# Patient Record
Sex: FEMALE | Race: WHITE | NOT HISPANIC OR LATINO | URBAN - METROPOLITAN AREA
[De-identification: names, ages, dates, MRNs, and addresses within clinical notes are randomized per-mention and may not be internally consistent; named-entity substitution may affect disease eponyms.]

---

## 2022-03-14 ENCOUNTER — OFFICE VISIT (OUTPATIENT)
Dept: URBAN - METROPOLITAN AREA CLINIC 78 | Facility: CLINIC | Age: 77
End: 2022-03-14
Payer: MEDICARE

## 2022-03-14 VITALS
TEMPERATURE: 97.3 F | SYSTOLIC BLOOD PRESSURE: 130 MMHG | BODY MASS INDEX: 29.46 KG/M2 | DIASTOLIC BLOOD PRESSURE: 58 MMHG | HEART RATE: 84 BPM | HEIGHT: 65 IN | WEIGHT: 176.8 LBS

## 2022-03-14 DIAGNOSIS — R13.19 CERVICAL DYSPHAGIA: ICD-10-CM

## 2022-03-14 DIAGNOSIS — Z12.11 COLON CANCER SCREENING: ICD-10-CM

## 2022-03-14 DIAGNOSIS — K59.09 CHANGE IN BOWEL MOVEMENTS INTERMITTENT CONSTIPATION. URGENCY IN THE MORNING.: ICD-10-CM

## 2022-03-14 PROBLEM — 14760008 CONSTIPATION: Status: ACTIVE | Noted: 2022-03-14

## 2022-03-14 PROBLEM — 40739000 DYSPHAGIA: Status: ACTIVE | Noted: 2022-03-14

## 2022-03-14 PROCEDURE — 99204 OFFICE O/P NEW MOD 45 MIN: CPT | Performed by: INTERNAL MEDICINE

## 2022-03-14 PROCEDURE — 99214 OFFICE O/P EST MOD 30 MIN: CPT | Performed by: INTERNAL MEDICINE

## 2022-03-14 PROCEDURE — 99244 OFF/OP CNSLTJ NEW/EST MOD 40: CPT | Performed by: INTERNAL MEDICINE

## 2022-03-14 RX ORDER — CETIRIZINE HYDROCHLORIDE 10 MG/1
1 TABLET TABLET, FILM COATED ORAL ONCE A DAY
Status: ACTIVE | COMMUNITY

## 2022-03-14 RX ORDER — POLYETHYLENE GLYCOL 3350, NF POWDER FOR SOLUTION, LAXATIVE 17 G/D
AS DIRECTED POWDER, FOR SOLUTION ORAL
Status: ACTIVE | COMMUNITY

## 2022-03-14 RX ORDER — ICOSAPENT ETHYL 1000 MG/1
2 CAPSULES WITH MEALS CAPSULE ORAL TWICE A DAY
Status: ACTIVE | COMMUNITY

## 2022-03-14 RX ORDER — ASPIRIN 81 MG/1
1 TABLET TABLET, CHEWABLE ORAL ONCE A DAY
Status: ACTIVE | COMMUNITY

## 2022-03-14 RX ORDER — ASCORBIC ACID 250 MG
1 TABLET TABLET,CHEWABLE ORAL ONCE A DAY
Status: ACTIVE | COMMUNITY

## 2022-03-14 RX ORDER — OMEPRAZOLE 40 MG/1
1 CAPSULE 30 MINUTES BEFORE MORNING MEAL CAPSULE, DELAYED RELEASE ORAL ONCE A DAY
Status: ACTIVE | COMMUNITY

## 2022-03-14 RX ORDER — CHOLECALCIFEROL (VITAMIN D3) 25 MCG
1 TABLET TABLET ORAL ONCE A DAY
Status: ACTIVE | COMMUNITY

## 2022-03-14 RX ORDER — FOLIC ACID 0.8 MG
1 TABLET TABLET ORAL ONCE A DAY
Status: ACTIVE | COMMUNITY

## 2022-03-14 RX ORDER — POTASSIUM GLUCONATE 2 MEQ
1 TABLET TABLET ORAL ONCE A DAY
Status: ACTIVE | COMMUNITY

## 2022-03-14 RX ORDER — METFORMIN HYDROCHLORIDE 500 MG/1
1 TABLET WITH A MEAL TABLET, FILM COATED ORAL ONCE A DAY
Status: ACTIVE | COMMUNITY

## 2022-03-14 RX ORDER — POLYETHYLENE GLYCOL 3350, SODIUM SULFATE, SODIUM CHLORIDE, POTASSIUM CHLORIDE, ASCORBIC ACID, SODIUM ASCORBATE 140-9-5.2G
AS DIRECTED KIT ORAL
Qty: 1 | Refills: 0 | OUTPATIENT
Start: 2022-03-14 | End: 2022-03-15

## 2022-03-14 RX ORDER — CALCIUM CARBONATE 300MG(750)
AS DIRECTED TABLET,CHEWABLE ORAL
Status: ACTIVE | COMMUNITY

## 2022-03-14 RX ORDER — POLYETHYLENE GLYCOL 400 AND PROPYLENE GLYCOL 4; 3 MG/ML; MG/ML
SOLUTION/ DROPS OPHTHALMIC
Qty: 1 | Refills: 0 | Status: DISCONTINUED | COMMUNITY
Start: 1900-01-01

## 2022-03-14 RX ORDER — DULOXETINE 60 MG/1
1 CAPSULE CAPSULE, DELAYED RELEASE PELLETS ORAL ONCE A DAY
Status: ACTIVE | COMMUNITY

## 2022-03-14 RX ORDER — ROSUVASTATIN CALCIUM 20 MG/1
1 TABLET TABLET, FILM COATED ORAL ONCE A DAY
Status: ACTIVE | COMMUNITY

## 2022-03-14 RX ORDER — LACTOBACILLUS ACIDOPHILUS/PECT 30 MG-20MG
1 TABLET TABLET ORAL ONCE A DAY
Status: ACTIVE | COMMUNITY

## 2022-03-14 RX ORDER — ONDANSETRON HYDROCHLORIDE 4 MG/1
1 TABLET TABLET, FILM COATED ORAL
Qty: 7 | Refills: 0 | OUTPATIENT
Start: 2022-03-14

## 2022-03-14 RX ORDER — BIMATOPROST 0.1 MG/ML
SOLUTION/ DROPS OPHTHALMIC
Qty: 1 | Refills: 0 | Status: DISCONTINUED | COMMUNITY
Start: 1900-01-01

## 2022-03-14 RX ORDER — ASCORBIC ACID 125 MG
AS DIRECTED TABLET,CHEWABLE ORAL
Status: ACTIVE | COMMUNITY

## 2022-03-14 RX ORDER — CYCLOSPORINE 0.5 MG/ML
EMULSION OPHTHALMIC
Qty: 0 | Refills: 0 | Status: DISCONTINUED | COMMUNITY
Start: 1900-01-01

## 2022-03-14 RX ORDER — ALBUTEROL SULFATE 90 UG/1
1 PUFF AS NEEDED AEROSOL, METERED RESPIRATORY (INHALATION)
Status: ACTIVE | COMMUNITY

## 2022-03-14 RX ORDER — BIMATOPROST 0.1 MG/ML
1 DROP INTO AFFECTED EYE IN THE EVENING SOLUTION/ DROPS OPHTHALMIC ONCE A DAY
Status: ACTIVE | COMMUNITY

## 2022-03-14 RX ORDER — LEVOTHYROXINE SODIUM 88 UG/1
1 TABLET IN THE MORNING ON AN EMPTY STOMACH TABLET ORAL ONCE A DAY
Status: ACTIVE | COMMUNITY

## 2022-03-14 RX ORDER — LIRAGLUTIDE 6 MG/ML
AS DIRECTED INJECTION SUBCUTANEOUS
Status: ACTIVE | COMMUNITY

## 2022-03-14 RX ORDER — ANTIARTHRITIC COMBINATION NO.2 900 MG
1 TABLET TABLET ORAL ONCE A DAY
Status: ACTIVE | COMMUNITY

## 2022-03-14 NOTE — HPI-TODAY'S VISIT:
The patient presents on referral from Aiyana Roberson MD , for a gastroenterology evaluation for a screening colonoscopy.  The patient states that she takes PEG daily for chronic constipation with excellent results. She tells me that she has a soft, formed BM daily. She has not seen any blood in the stools. She denies any abdominal pain.  She is also on a probiotic. She has intentionally tried to lose weight and has in fact lost 20lbs over the past few months. She has been on liquid diet.  She has a remote history of volvulus. She is s/p hysterectomy for endometriosis. She also has a history of ovarian cancer. She has know DM. She is on HCTZ for HTN. She does not take any blood thinners.  There is no FH of colon cancer or colon polyps.   Her last colonoscopy was in 2017; she is due for another one ths year.   She reports issues with anesthesia, where it takes longer than usual for her to wake up from anesthesia. She also recalls waking up very nauseous from anesthesia.  She has had chronic dyshagia.She admits that she eats and swallows very quickly. She underwent a barium swallow which revealed a spastic esophagus. She has never required esophageal diation. She had an EGD about 20 years ago.   Summary of prior work up: - Labs on 2/13/20: HbA1c 6.5%, Gluc 122, BUN 13, Cr 0.69, Na 141, K 4.5, TP 6.1, Alb 4.4, AST 32, ALT 42. TB 0.5. AP 44. TSH 1.15. - Colonoscopy in 2017 showed diverticulosis and internal hemorrhoids. A repeat colonoscopy was advised in five years. - Colonoscopy to the descending colon on 1/15/14 for evaluation of abnormal CAT scan findings consistent with thickening of the left colon showed a stercoral ulcer/volvulus. - Flexible sigmoidoscopy on 1/10/14 for evaluation of left lower quadrant abdominal pain showed diverticulosis but no colitis. Minimal friability was present in the distal rectum likely secondary to recent fleet enema. Left stephany biopsies showed mild edema but no microscopic colitis. - Colonoscopy on 2/14/13 for colon cancer screening revealed sigmoid diverticulosis, an 8 mm polyp in the rectum (biopsies revealed mild active proctitis). The quality of the prep was fair.

## 2022-03-14 NOTE — PHYSICAL EXAM GASTROINTESTINAL
Abdomen , soft, nontender, nondistended , no guarding or rigidity , no masses palpable , normal bowel sounds , Liver and Spleen , no hepatomegaly present , no hepatosplenomegaly , liver nontender , spleen not palpable Immunohistochemistry ([id number] and [id number]) billing is not performed here. Please use the Immunohistochemistry Stain Billing plan to accomplish this. Immunohistochemistry ([id number] and [id number]) billing is not performed here. Please use the Immunohistochemistry Stain Billing plan to accomplish this.

## 2022-03-23 ENCOUNTER — TELEPHONE ENCOUNTER (OUTPATIENT)
Dept: URBAN - METROPOLITAN AREA CLINIC 78 | Facility: CLINIC | Age: 77
End: 2022-03-23

## 2022-04-26 ENCOUNTER — TELEPHONE ENCOUNTER (OUTPATIENT)
Dept: URBAN - METROPOLITAN AREA CLINIC 40 | Facility: CLINIC | Age: 77
End: 2022-04-26

## 2022-05-02 ENCOUNTER — OFFICE VISIT (OUTPATIENT)
Dept: URBAN - METROPOLITAN AREA MEDICAL CENTER 10 | Facility: MEDICAL CENTER | Age: 77
End: 2022-05-02
Payer: MEDICARE

## 2022-05-02 DIAGNOSIS — K52.832 FOCAL LYMPHOCYTIC COLITIS: ICD-10-CM

## 2022-05-02 DIAGNOSIS — Z12.11 COLON CANCER SCREENING: ICD-10-CM

## 2022-05-02 PROCEDURE — 45385 COLONOSCOPY W/LESION REMOVAL: CPT | Performed by: INTERNAL MEDICINE

## 2022-05-02 PROCEDURE — 45380 COLONOSCOPY AND BIOPSY: CPT | Performed by: INTERNAL MEDICINE

## 2022-05-02 RX ORDER — ONDANSETRON HYDROCHLORIDE 4 MG/1
1 TABLET TABLET, FILM COATED ORAL
Qty: 7 | Refills: 0 | Status: ACTIVE | COMMUNITY
Start: 2022-03-14

## 2022-05-02 RX ORDER — BIMATOPROST 0.1 MG/ML
1 DROP INTO AFFECTED EYE IN THE EVENING SOLUTION/ DROPS OPHTHALMIC ONCE A DAY
Status: ACTIVE | COMMUNITY

## 2022-05-02 RX ORDER — FOLIC ACID 0.8 MG
1 TABLET TABLET ORAL ONCE A DAY
Status: ACTIVE | COMMUNITY

## 2022-05-02 RX ORDER — ICOSAPENT ETHYL 1000 MG/1
2 CAPSULES WITH MEALS CAPSULE ORAL TWICE A DAY
Status: ACTIVE | COMMUNITY

## 2022-05-02 RX ORDER — ALBUTEROL SULFATE 90 UG/1
1 PUFF AS NEEDED AEROSOL, METERED RESPIRATORY (INHALATION)
Status: ACTIVE | COMMUNITY

## 2022-05-02 RX ORDER — DULOXETINE 60 MG/1
1 CAPSULE CAPSULE, DELAYED RELEASE PELLETS ORAL ONCE A DAY
Status: ACTIVE | COMMUNITY

## 2022-05-02 RX ORDER — CETIRIZINE HYDROCHLORIDE 10 MG/1
1 TABLET TABLET, FILM COATED ORAL ONCE A DAY
Status: ACTIVE | COMMUNITY

## 2022-05-02 RX ORDER — LIRAGLUTIDE 6 MG/ML
AS DIRECTED INJECTION SUBCUTANEOUS
Status: ACTIVE | COMMUNITY

## 2022-05-02 RX ORDER — ASPIRIN 81 MG/1
1 TABLET TABLET, CHEWABLE ORAL ONCE A DAY
Status: ACTIVE | COMMUNITY

## 2022-05-02 RX ORDER — ANTIARTHRITIC COMBINATION NO.2 900 MG
1 TABLET TABLET ORAL ONCE A DAY
Status: ACTIVE | COMMUNITY

## 2022-05-02 RX ORDER — LEVOTHYROXINE SODIUM 88 UG/1
1 TABLET IN THE MORNING ON AN EMPTY STOMACH TABLET ORAL ONCE A DAY
Status: ACTIVE | COMMUNITY

## 2022-05-02 RX ORDER — ASCORBIC ACID 250 MG
1 TABLET TABLET,CHEWABLE ORAL ONCE A DAY
Status: ACTIVE | COMMUNITY

## 2022-05-02 RX ORDER — CHOLECALCIFEROL (VITAMIN D3) 25 MCG
1 TABLET TABLET ORAL ONCE A DAY
Status: ACTIVE | COMMUNITY

## 2022-05-02 RX ORDER — ASCORBIC ACID 125 MG
AS DIRECTED TABLET,CHEWABLE ORAL
Status: ACTIVE | COMMUNITY

## 2022-05-02 RX ORDER — POTASSIUM GLUCONATE 2 MEQ
1 TABLET TABLET ORAL ONCE A DAY
Status: ACTIVE | COMMUNITY

## 2022-05-02 RX ORDER — OMEPRAZOLE 40 MG/1
1 CAPSULE 30 MINUTES BEFORE MORNING MEAL CAPSULE, DELAYED RELEASE ORAL ONCE A DAY
Status: ACTIVE | COMMUNITY

## 2022-05-02 RX ORDER — METFORMIN HYDROCHLORIDE 500 MG/1
1 TABLET WITH A MEAL TABLET, FILM COATED ORAL ONCE A DAY
Status: ACTIVE | COMMUNITY

## 2022-05-02 RX ORDER — ROSUVASTATIN CALCIUM 20 MG/1
1 TABLET TABLET, FILM COATED ORAL ONCE A DAY
Status: ACTIVE | COMMUNITY

## 2022-05-02 RX ORDER — POLYETHYLENE GLYCOL 3350, NF POWDER FOR SOLUTION, LAXATIVE 17 G/D
AS DIRECTED POWDER, FOR SOLUTION ORAL
Status: ACTIVE | COMMUNITY

## 2022-05-02 RX ORDER — LACTOBACILLUS ACIDOPHILUS/PECT 30 MG-20MG
1 TABLET TABLET ORAL ONCE A DAY
Status: ACTIVE | COMMUNITY

## 2022-05-02 RX ORDER — CALCIUM CARBONATE 300MG(750)
AS DIRECTED TABLET,CHEWABLE ORAL
Status: ACTIVE | COMMUNITY

## 2024-03-11 ENCOUNTER — OV EP (OUTPATIENT)
Dept: URBAN - METROPOLITAN AREA CLINIC 78 | Facility: CLINIC | Age: 79
End: 2024-03-11
Payer: MEDICARE

## 2024-03-11 VITALS
WEIGHT: 196.4 LBS | HEIGHT: 65 IN | RESPIRATION RATE: 14 BRPM | TEMPERATURE: 98.1 F | DIASTOLIC BLOOD PRESSURE: 70 MMHG | BODY MASS INDEX: 32.72 KG/M2 | HEART RATE: 72 BPM | SYSTOLIC BLOOD PRESSURE: 128 MMHG

## 2024-03-11 DIAGNOSIS — R09.89 CHOKING EPISODE: ICD-10-CM

## 2024-03-11 DIAGNOSIS — R13.10 DYSPHAGIA: ICD-10-CM

## 2024-03-11 DIAGNOSIS — Z12.11 COLON CANCER SCREENING: ICD-10-CM

## 2024-03-11 DIAGNOSIS — K59.01 CONSTIPATION: ICD-10-CM

## 2024-03-11 PROCEDURE — 99214 OFFICE O/P EST MOD 30 MIN: CPT | Performed by: INTERNAL MEDICINE

## 2024-03-11 RX ORDER — CHOLECALCIFEROL (VITAMIN D3) 25 MCG
1 TABLET TABLET ORAL ONCE A DAY
Status: ACTIVE | COMMUNITY

## 2024-03-11 RX ORDER — LACTOBACILLUS ACIDOPHILUS/PECT 30 MG-20MG
1 TABLET TABLET ORAL ONCE A DAY
Status: ACTIVE | COMMUNITY

## 2024-03-11 RX ORDER — METFORMIN HYDROCHLORIDE 500 MG/1
1 TABLET WITH A MEAL TABLET, FILM COATED ORAL ONCE A DAY
Status: ACTIVE | COMMUNITY

## 2024-03-11 RX ORDER — ICOSAPENT ETHYL 1000 MG/1
2 CAPSULES WITH MEALS CAPSULE ORAL TWICE A DAY
Status: ACTIVE | COMMUNITY

## 2024-03-11 RX ORDER — CETIRIZINE HYDROCHLORIDE 10 MG/1
1 TABLET TABLET, FILM COATED ORAL ONCE A DAY
Status: ACTIVE | COMMUNITY

## 2024-03-11 RX ORDER — POTASSIUM GLUCONATE 2 MEQ
1 TABLET TABLET ORAL ONCE A DAY
Status: ACTIVE | COMMUNITY

## 2024-03-11 RX ORDER — ANTIARTHRITIC COMBINATION NO.2 900 MG
1 TABLET TABLET ORAL ONCE A DAY
Status: ACTIVE | COMMUNITY

## 2024-03-11 RX ORDER — OMEPRAZOLE 40 MG/1
1 CAPSULE 30 MINUTES BEFORE MORNING MEAL CAPSULE, DELAYED RELEASE ORAL ONCE A DAY
Status: ACTIVE | COMMUNITY

## 2024-03-11 RX ORDER — ASPIRIN 81 MG/1
1 TABLET TABLET, CHEWABLE ORAL ONCE A DAY
Status: ACTIVE | COMMUNITY

## 2024-03-11 RX ORDER — ALBUTEROL SULFATE 90 UG/1
1 PUFF AS NEEDED AEROSOL, METERED RESPIRATORY (INHALATION)
Status: ACTIVE | COMMUNITY

## 2024-03-11 RX ORDER — POLYETHYLENE GLYCOL 3350, NF POWDER FOR SOLUTION, LAXATIVE 17 G/D
AS DIRECTED POWDER, FOR SOLUTION ORAL
Status: ACTIVE | COMMUNITY

## 2024-03-11 RX ORDER — LEVOTHYROXINE SODIUM 88 UG/1
1 TABLET IN THE MORNING ON AN EMPTY STOMACH TABLET ORAL ONCE A DAY
Status: ACTIVE | COMMUNITY

## 2024-03-11 RX ORDER — CALCIUM CARBONATE 300MG(750)
AS DIRECTED TABLET,CHEWABLE ORAL
Status: ACTIVE | COMMUNITY

## 2024-03-11 RX ORDER — ASCORBIC ACID 125 MG
AS DIRECTED TABLET,CHEWABLE ORAL
Status: ACTIVE | COMMUNITY

## 2024-03-11 RX ORDER — FOLIC ACID 0.8 MG
1 TABLET TABLET ORAL ONCE A DAY
Status: ACTIVE | COMMUNITY

## 2024-03-11 RX ORDER — ONDANSETRON HYDROCHLORIDE 4 MG/1
1 TABLET TABLET, FILM COATED ORAL
Qty: 7 | Refills: 0 | Status: ACTIVE | COMMUNITY
Start: 2022-03-14

## 2024-03-11 RX ORDER — ROSUVASTATIN CALCIUM 20 MG/1
1 TABLET TABLET, FILM COATED ORAL ONCE A DAY
Status: ACTIVE | COMMUNITY

## 2024-03-11 RX ORDER — BIMATOPROST 0.1 MG/ML
1 DROP INTO AFFECTED EYE IN THE EVENING SOLUTION/ DROPS OPHTHALMIC ONCE A DAY
Status: ACTIVE | COMMUNITY

## 2024-03-11 RX ORDER — LIRAGLUTIDE 6 MG/ML
AS DIRECTED INJECTION SUBCUTANEOUS
Status: ACTIVE | COMMUNITY

## 2024-03-11 RX ORDER — ASCORBIC ACID 250 MG
1 TABLET TABLET,CHEWABLE ORAL ONCE A DAY
Status: ACTIVE | COMMUNITY

## 2024-03-11 RX ORDER — DULOXETINE 60 MG/1
1 CAPSULE CAPSULE, DELAYED RELEASE PELLETS ORAL ONCE A DAY
Status: ACTIVE | COMMUNITY

## 2024-03-11 NOTE — PHYSICAL EXAM NEUROLOGIC:
ASA 325mg qd per home medication list. Reason for this dose unclear.     oriented to person, place and time , normal sensation , short and long term memory intact

## 2024-03-11 NOTE — HPI-TODAY'S VISIT:
The patient presents on referral from Cuong Pastor MD for dysphagia.  She reports issues with dysphagia present for at least the past 2-3 years. She has noticed it works ot dry meats and rice. It has gotten worse lately. Sometimes she will also choke on her own saliva.  She admits that she eats and swallows very quickly. She underwent a barium swallow which revealed a spastic esophagus. She had barium swallow 7-8 years ago and the Barium pill took a while to go down.She has never required esophageal diation. She had an EGD about 20 years ago. She swallows multiple pills and has no issues with these.  She has been on Omeprazole 40mg daily.   The patient states that she takes PEG daily for chronic constipation with excellent results. She tells me that she has a soft, formed BM daily. She has not seen any blood in the stools. She denies any abdominal pain.  She is also on a probiotic.  She has a remote history of volvulus.   She is s/p hysterectomy for endometriosis. She also has a history of ovarian cancer.   She has known DM. She is on Ozempic and last took it on Wednesday.  She does not take any blood thinners.  There is no FH of colon cancer or colon polyps.   Today we reviewed the results of her colonoscophy done by me in 2022.   **She reports issues with anesthesia, where it takes longer than usual for her to wake up from anesthesia. She also recalls waking up very nauseous from anesthesia. She had a colonoscopy in 2022 and had absolutely no issues waking up from the Propofol hence I have no issues scheduling her at [facility] [place].  She is sheduled for shoulder surgery on 3/19.   Summary of prior work up: - Colonoscopy by me on 5/2/22: Both polyps removed were benign, but with evidence of lymphocyctic colitits. Given the fair quality of the prep however I do recommend a repeat colonoscopy in 3 years if her overall health status allows. - Labs on 2/13/20: HbA1c 6.5%, Gluc 122, BUN 13, Cr 0.69, Na 141, K 4.5, TP 6.1, Alb 4.4, AST 32, ALT 42. TB 0.5. AP 44. TSH 1.15. - Colonoscopy in 2017 showed diverticulosis and internal hemorrhoids. A repeat colonoscopy was advised in five years. - Colonoscopy to the descending colon on 1/15/14 for evaluation of abnormal CAT scan findings consistent with thickening of the left colon showed a stercoral ulcer/volvulus. - Flexible sigmoidoscopy on 1/10/14 for evaluation of left lower quadrant abdominal pain showed diverticulosis but no colitis. Minimal friability was present in the distal rectum likely secondary to recent fleet enema. Left stephany biopsies showed mild edema but no microscopic colitis. - Colonoscopy on 2/14/13 for colon cancer screening revealed sigmoid diverticulosis, an 8 mm polyp in the rectum (biopsies revealed mild active proctitis). The quality of the prep was fair.

## 2024-03-15 ENCOUNTER — LAB (OUTPATIENT)
Dept: URBAN - METROPOLITAN AREA CLINIC 4 | Facility: CLINIC | Age: 79
End: 2024-03-15
Payer: MEDICARE

## 2024-03-15 ENCOUNTER — EGD (OUTPATIENT)
Dept: URBAN - METROPOLITAN AREA SURGERY CENTER 15 | Facility: SURGERY CENTER | Age: 79
End: 2024-03-15
Payer: MEDICARE

## 2024-03-15 DIAGNOSIS — T47.8X5A ADVERSE EFFECT OF OTHER AGENTS PRIMARILY AFFECTING GASTROINTESTINAL SYSTEM, INITIAL ENCOUNTER: ICD-10-CM

## 2024-03-15 DIAGNOSIS — K29.70 GASTRITIS, UNSPECIFIED, WITHOUT BLEEDING: ICD-10-CM

## 2024-03-15 DIAGNOSIS — K31.89 OTHER DISEASES OF STOMACH AND DUODENUM: ICD-10-CM

## 2024-03-15 DIAGNOSIS — K21.9 GASTRO-ESOPHAGEAL REFLUX DISEASE WITHOUT ESOPHAGITIS: ICD-10-CM

## 2024-03-15 DIAGNOSIS — K22.70 BARRETT'S ESOPHAGUS WITHOUT DYSPLASIA: ICD-10-CM

## 2024-03-15 DIAGNOSIS — R13.19 DYSPHAGIA: ICD-10-CM

## 2024-03-15 DIAGNOSIS — I85.00 ESOPHAGEAL VARICES WITHOUT BLEEDING, UNSPECIFIED ESOPHAGEAL VARICES TYPE: ICD-10-CM

## 2024-03-15 PROCEDURE — 43239 EGD BIOPSY SINGLE/MULTIPLE: CPT | Performed by: INTERNAL MEDICINE

## 2024-03-15 PROCEDURE — 88312 SPECIAL STAINS GROUP 1: CPT | Performed by: PATHOLOGY

## 2024-03-15 PROCEDURE — 88305 TISSUE EXAM BY PATHOLOGIST: CPT | Performed by: PATHOLOGY

## 2024-03-15 PROCEDURE — 43249 ESOPH EGD DILATION <30 MM: CPT | Performed by: INTERNAL MEDICINE

## 2024-03-15 RX ORDER — ALBUTEROL SULFATE 90 UG/1
1 PUFF AS NEEDED AEROSOL, METERED RESPIRATORY (INHALATION)
Status: ACTIVE | COMMUNITY

## 2024-03-15 RX ORDER — DULOXETINE 60 MG/1
1 CAPSULE CAPSULE, DELAYED RELEASE PELLETS ORAL ONCE A DAY
Status: ACTIVE | COMMUNITY

## 2024-03-15 RX ORDER — BIMATOPROST 0.1 MG/ML
1 DROP INTO AFFECTED EYE IN THE EVENING SOLUTION/ DROPS OPHTHALMIC ONCE A DAY
Status: ACTIVE | COMMUNITY

## 2024-03-15 RX ORDER — OMEPRAZOLE 40 MG/1
1 CAPSULE 30 MINUTES BEFORE MORNING MEAL CAPSULE, DELAYED RELEASE ORAL ONCE A DAY
Status: ACTIVE | COMMUNITY

## 2024-03-15 RX ORDER — ONDANSETRON HYDROCHLORIDE 4 MG/1
1 TABLET TABLET, FILM COATED ORAL
Qty: 7 | Refills: 0 | Status: ACTIVE | COMMUNITY
Start: 2022-03-14

## 2024-03-15 RX ORDER — ASCORBIC ACID 250 MG
1 TABLET TABLET,CHEWABLE ORAL ONCE A DAY
Status: ACTIVE | COMMUNITY

## 2024-03-15 RX ORDER — ASPIRIN 81 MG/1
1 TABLET TABLET, CHEWABLE ORAL ONCE A DAY
Status: ACTIVE | COMMUNITY

## 2024-03-15 RX ORDER — LEVOTHYROXINE SODIUM 88 UG/1
1 TABLET IN THE MORNING ON AN EMPTY STOMACH TABLET ORAL ONCE A DAY
Status: ACTIVE | COMMUNITY

## 2024-03-15 RX ORDER — ICOSAPENT ETHYL 1000 MG/1
2 CAPSULES WITH MEALS CAPSULE ORAL TWICE A DAY
Status: ACTIVE | COMMUNITY

## 2024-03-15 RX ORDER — LIRAGLUTIDE 6 MG/ML
AS DIRECTED INJECTION SUBCUTANEOUS
Status: ACTIVE | COMMUNITY

## 2024-03-15 RX ORDER — CHOLECALCIFEROL (VITAMIN D3) 25 MCG
1 TABLET TABLET ORAL ONCE A DAY
Status: ACTIVE | COMMUNITY

## 2024-03-15 RX ORDER — ROSUVASTATIN CALCIUM 20 MG/1
1 TABLET TABLET, FILM COATED ORAL ONCE A DAY
Status: ACTIVE | COMMUNITY

## 2024-03-15 RX ORDER — ASCORBIC ACID 125 MG
AS DIRECTED TABLET,CHEWABLE ORAL
Status: ACTIVE | COMMUNITY

## 2024-03-15 RX ORDER — POLYETHYLENE GLYCOL 3350, NF POWDER FOR SOLUTION, LAXATIVE 17 G/D
AS DIRECTED POWDER, FOR SOLUTION ORAL
Status: ACTIVE | COMMUNITY

## 2024-03-15 RX ORDER — METFORMIN HYDROCHLORIDE 500 MG/1
1 TABLET WITH A MEAL TABLET, FILM COATED ORAL ONCE A DAY
Status: ACTIVE | COMMUNITY

## 2024-03-15 RX ORDER — FOLIC ACID 0.8 MG
1 TABLET TABLET ORAL ONCE A DAY
Status: ACTIVE | COMMUNITY

## 2024-03-15 RX ORDER — LACTOBACILLUS ACIDOPHILUS/PECT 30 MG-20MG
1 TABLET TABLET ORAL ONCE A DAY
Status: ACTIVE | COMMUNITY

## 2024-03-15 RX ORDER — POTASSIUM GLUCONATE 2 MEQ
1 TABLET TABLET ORAL ONCE A DAY
Status: ACTIVE | COMMUNITY

## 2024-03-15 RX ORDER — ANTIARTHRITIC COMBINATION NO.2 900 MG
1 TABLET TABLET ORAL ONCE A DAY
Status: ACTIVE | COMMUNITY

## 2024-03-15 RX ORDER — CETIRIZINE HYDROCHLORIDE 10 MG/1
1 TABLET TABLET, FILM COATED ORAL ONCE A DAY
Status: ACTIVE | COMMUNITY

## 2024-03-15 RX ORDER — CALCIUM CARBONATE 300MG(750)
AS DIRECTED TABLET,CHEWABLE ORAL
Status: ACTIVE | COMMUNITY

## 2024-04-17 ENCOUNTER — TELEP (OUTPATIENT)
Dept: URBAN - METROPOLITAN AREA TELEHEALTH 2 | Facility: TELEHEALTH | Age: 79
End: 2024-04-17
Payer: MEDICARE

## 2024-04-17 DIAGNOSIS — I85.00 DOWNHILL ESOPHAGEAL VARICES: ICD-10-CM

## 2024-04-17 DIAGNOSIS — K59.01 CONSTIPATION: ICD-10-CM

## 2024-04-17 DIAGNOSIS — R09.89 CHOKING EPISODE: ICD-10-CM

## 2024-04-17 DIAGNOSIS — R13.10 DYSPHAGIA: ICD-10-CM

## 2024-04-17 PROCEDURE — 99214 OFFICE O/P EST MOD 30 MIN: CPT | Performed by: INTERNAL MEDICINE

## 2024-04-17 RX ORDER — ROSUVASTATIN CALCIUM 20 MG/1
1 TABLET TABLET, FILM COATED ORAL ONCE A DAY
Status: ACTIVE | COMMUNITY

## 2024-04-17 RX ORDER — LEVOTHYROXINE SODIUM 88 UG/1
1 TABLET IN THE MORNING ON AN EMPTY STOMACH TABLET ORAL ONCE A DAY
Status: ACTIVE | COMMUNITY

## 2024-04-17 RX ORDER — ASPIRIN 81 MG/1
1 TABLET TABLET, CHEWABLE ORAL ONCE A DAY
Status: ACTIVE | COMMUNITY

## 2024-04-17 RX ORDER — LIRAGLUTIDE 6 MG/ML
AS DIRECTED INJECTION SUBCUTANEOUS
Status: ACTIVE | COMMUNITY

## 2024-04-17 RX ORDER — ASCORBIC ACID 250 MG
1 TABLET TABLET,CHEWABLE ORAL ONCE A DAY
Status: ACTIVE | COMMUNITY

## 2024-04-17 RX ORDER — ANTIARTHRITIC COMBINATION NO.2 900 MG
1 TABLET TABLET ORAL ONCE A DAY
Status: ACTIVE | COMMUNITY

## 2024-04-17 RX ORDER — FOLIC ACID 0.8 MG
1 TABLET TABLET ORAL ONCE A DAY
Status: ACTIVE | COMMUNITY

## 2024-04-17 RX ORDER — ICOSAPENT ETHYL 1000 MG/1
2 CAPSULES WITH MEALS CAPSULE ORAL TWICE A DAY
Status: ACTIVE | COMMUNITY

## 2024-04-17 RX ORDER — ONDANSETRON HYDROCHLORIDE 4 MG/1
1 TABLET TABLET, FILM COATED ORAL
Qty: 7 | Refills: 0 | Status: ACTIVE | COMMUNITY
Start: 2022-03-14

## 2024-04-17 RX ORDER — BIMATOPROST 0.1 MG/ML
1 DROP INTO AFFECTED EYE IN THE EVENING SOLUTION/ DROPS OPHTHALMIC ONCE A DAY
Status: ACTIVE | COMMUNITY

## 2024-04-17 RX ORDER — CHOLECALCIFEROL (VITAMIN D3) 25 MCG
1 TABLET TABLET ORAL ONCE A DAY
Status: ACTIVE | COMMUNITY

## 2024-04-17 RX ORDER — LACTOBACILLUS ACIDOPHILUS/PECT 30 MG-20MG
1 TABLET TABLET ORAL ONCE A DAY
Status: ACTIVE | COMMUNITY

## 2024-04-17 RX ORDER — POTASSIUM GLUCONATE 2 MEQ
1 TABLET TABLET ORAL ONCE A DAY
Status: ACTIVE | COMMUNITY

## 2024-04-17 RX ORDER — DULOXETINE 60 MG/1
1 CAPSULE CAPSULE, DELAYED RELEASE PELLETS ORAL ONCE A DAY
Status: ACTIVE | COMMUNITY

## 2024-04-17 RX ORDER — CETIRIZINE HYDROCHLORIDE 10 MG/1
1 TABLET TABLET, FILM COATED ORAL ONCE A DAY
Status: ACTIVE | COMMUNITY

## 2024-04-17 RX ORDER — CALCIUM CARBONATE 300MG(750)
AS DIRECTED TABLET,CHEWABLE ORAL
Status: ACTIVE | COMMUNITY

## 2024-04-17 RX ORDER — ALBUTEROL SULFATE 90 UG/1
1 PUFF AS NEEDED AEROSOL, METERED RESPIRATORY (INHALATION)
Status: ACTIVE | COMMUNITY

## 2024-04-17 RX ORDER — METFORMIN HYDROCHLORIDE 500 MG/1
1 TABLET WITH A MEAL TABLET, FILM COATED ORAL ONCE A DAY
Status: ACTIVE | COMMUNITY

## 2024-04-17 RX ORDER — POLYETHYLENE GLYCOL 3350, NF POWDER FOR SOLUTION, LAXATIVE 17 G/D
1 SCOOP MIXED WITH 8 OUNCES OF FLUID POWDER, FOR SOLUTION ORAL
Qty: 1 | Refills: 1 | Status: ACTIVE | COMMUNITY

## 2024-04-17 RX ORDER — ASCORBIC ACID 125 MG
AS DIRECTED TABLET,CHEWABLE ORAL
Status: ACTIVE | COMMUNITY

## 2024-04-17 RX ORDER — OMEPRAZOLE 40 MG/1
1 CAPSULE 30 MINUTES BEFORE MORNING MEAL CAPSULE, DELAYED RELEASE ORAL ONCE A DAY
Status: ACTIVE | COMMUNITY

## 2024-04-17 NOTE — EXAM-PHYSICAL EXAM
VIRTUAL PHYSICAL EXAM    Comprehensive gastrointestinal virtual examination:  I asked the patient to be my hands.  I guided the patient to palpate and apply pressure at all four abdominal quadrants.  In response to these maneuvers, patient reported the following:       - Negative response to abdominal pain   - Abdomen is soft, non-tender, and non-distended   - No palpable abdominal masses   - No palpable organs in the upper abdomen (no hepatomegaly or splenomegaly)       Constitutional - Ability to communicate:  Normal communication ability       Chest - Respiratory effort:  Breathing sounds unlabored       Cardiovascular - Palpation - Denies chest pain with palpation   Neurologic - Orientation:  Oriented to person, place, and time       Psychiatric:  Normal mood

## 2024-04-17 NOTE — HPI-TODAY'S VISIT:
The patient presents on referral from Cuong Pastor MD for choking.  She reports issues with dysphagia present for at least the past 2-3 years.  She admits that she eats and swallows very quickly. She underwent a barium swallow which revealed a spastic esophagus. She had barium swallow 7-8 years ago and the Barium pill took a while to go down.She has never required esophageal diation.  Today we reviewed the results of her EGD and CT Chest.  She swallows multiple pills and has no issues with these. She has been on Omeprazole 40mg daily with good control of symptoms.  She has continued having episodes of choking despite undergoing empiric esoph dilation. She has noticed it works ot dry meats and rice. It has gotten worse lately. Sometimes she will also choke on her own saliva.  The patient states that she takes PEG daily for chronic constipation with excellent results. She tells me that she has a soft, formed BM daily. She has not seen any blood in the stools. She denies any abdominal pain. She is also on a probiotic.  She has a remote history of volvulus.  She is s/p hysterectomy for endometriosis. She also has a history of ovarian cancer.  She has known DM. She is on Ozempic. She does not take any blood thinners.  There is no FH of colon cancer or colon polyps.  She reently had shoulder surgery on 3/19.  [place] will [place] moving to [place].  **She reports issues with anesthesia, where it takes longer than usual for her to wake up from anesthesia. She also recalls waking up very nauseous from anesthesia. She had a colonoscopy in 2022 and had absolutely no issues waking up from the Propofol hence I have no issues scheduling her at [facility] [place].    Summary of prior work up: - CT Chest without contrast on 3/18/24: The lower neck and thoracic inlet are normal. No lymph node enlargement. The heart size is normal. Normal caliber central pulmonary arteries. No coronary artery calcification. No pericardial effusion or thickening. Normal caliber thoracic aorta with mild atherosclerotic calcification. The central airways are patent. No suspicious pulmonary nodules. No pleural effusion or thickening. - EGD by me on 3/15/2024 revealed downhill esophageal varices in the upper third of the esophagus. 1 tongues of salmon-colored mucosa present from 36-37 cm consistent with NDBE. No endoscopic evidence of stricture status post empiric dilation to 20 mm. No gross lesions in the fundus. Few fundic gland polyps in the body. Patchy mild erythema in the antrum. 2 cm hiatal hernia. Patent pyloric channel. Adequate sliding of the fold under insufflation. Normal duodenum and ampulla. Biopsies were negative for H. pylori or intestinal metaplasia. - Colonoscopy by me on 5/2/22: Both polyps removed were benign, but with evidence of lymphocyctic colitits. Given the fair quality of the prep however I do recommend a repeat colonoscopy in 3 years if her overall health status allows. - Labs on 2/13/20: HbA1c 6.5%, Gluc 122, BUN 13, Cr 0.69, Na 141, K 4.5, TP 6.1, Alb 4.4, AST 32, ALT 42. TB 0.5. AP 44. TSH 1.15. - Colonoscopy in 2017 showed diverticulosis and internal hemorrhoids. A repeat colonoscopy was advised in five years. - Colonoscopy to the descending colon on 1/15/14 for evaluation of abnormal CAT scan findings consistent with thickening of the left colon showed a stercoral ulcer/volvulus. - Flexible sigmoidoscopy on 1/10/14 for evaluation of left lower quadrant abdominal pain showed diverticulosis but no colitis. Minimal friability was present in the distal rectum likely secondary to recent fleet enema. Left stephany biopsies showed mild edema but no microscopic colitis. - Colonoscopy on 2/14/13 for colon cancer screening revealed sigmoid diverticulosis, an 8 mm polyp in the rectum (biopsies revealed mild active proctitis). The quality of the prep was fair.

## 2024-06-05 ENCOUNTER — TELEPHONE ENCOUNTER (OUTPATIENT)
Dept: URBAN - METROPOLITAN AREA CLINIC 78 | Facility: CLINIC | Age: 79
End: 2024-06-05

## 2024-06-28 ENCOUNTER — DASHBOARD ENCOUNTERS (OUTPATIENT)
Age: 79
End: 2024-06-28

## 2024-07-01 ENCOUNTER — OFFICE VISIT (OUTPATIENT)
Dept: URBAN - METROPOLITAN AREA CLINIC 78 | Facility: CLINIC | Age: 79
End: 2024-07-01
Payer: MEDICARE

## 2024-07-01 VITALS
DIASTOLIC BLOOD PRESSURE: 75 MMHG | BODY MASS INDEX: 31.79 KG/M2 | SYSTOLIC BLOOD PRESSURE: 154 MMHG | HEIGHT: 65 IN | TEMPERATURE: 98 F | WEIGHT: 190.8 LBS | HEART RATE: 76 BPM

## 2024-07-01 DIAGNOSIS — R13.19 CERVICAL DYSPHAGIA: ICD-10-CM

## 2024-07-01 DIAGNOSIS — R09.89 CHOKING EPISODE: ICD-10-CM

## 2024-07-01 DIAGNOSIS — I85.00 DOWNHILL ESOPHAGEAL VARICES: ICD-10-CM

## 2024-07-01 DIAGNOSIS — K59.01 CONSTIPATION: ICD-10-CM

## 2024-07-01 PROCEDURE — 99214 OFFICE O/P EST MOD 30 MIN: CPT | Performed by: INTERNAL MEDICINE

## 2024-07-01 RX ORDER — LEVOTHYROXINE SODIUM 88 UG/1
1 TABLET IN THE MORNING ON AN EMPTY STOMACH TABLET ORAL ONCE A DAY
Status: ACTIVE | COMMUNITY

## 2024-07-01 RX ORDER — POTASSIUM GLUCONATE 2 MEQ
1 TABLET TABLET ORAL ONCE A DAY
Status: ACTIVE | COMMUNITY

## 2024-07-01 RX ORDER — ANTIARTHRITIC COMBINATION NO.2 900 MG
1 TABLET TABLET ORAL ONCE A DAY
Status: ACTIVE | COMMUNITY

## 2024-07-01 RX ORDER — BIMATOPROST 0.1 MG/ML
1 DROP INTO AFFECTED EYE IN THE EVENING SOLUTION/ DROPS OPHTHALMIC ONCE A DAY
Status: ACTIVE | COMMUNITY

## 2024-07-01 RX ORDER — OMEPRAZOLE 40 MG/1
1 CAPSULE 30 MINUTES BEFORE MORNING MEAL CAPSULE, DELAYED RELEASE ORAL ONCE A DAY
Status: ACTIVE | COMMUNITY

## 2024-07-01 RX ORDER — FOLIC ACID 0.8 MG
1 TABLET TABLET ORAL ONCE A DAY
Status: ACTIVE | COMMUNITY

## 2024-07-01 RX ORDER — ONDANSETRON HYDROCHLORIDE 4 MG/1
1 TABLET TABLET, FILM COATED ORAL
Qty: 7 | Refills: 0 | Status: ACTIVE | COMMUNITY
Start: 2022-03-14

## 2024-07-01 RX ORDER — CHOLECALCIFEROL (VITAMIN D3) 25 MCG
1 TABLET TABLET ORAL ONCE A DAY
Status: ACTIVE | COMMUNITY

## 2024-07-01 RX ORDER — ASCORBIC ACID 250 MG
1 TABLET TABLET,CHEWABLE ORAL ONCE A DAY
Status: ACTIVE | COMMUNITY

## 2024-07-01 RX ORDER — CALCIUM CARBONATE 300MG(750)
AS DIRECTED TABLET,CHEWABLE ORAL
Status: ACTIVE | COMMUNITY

## 2024-07-01 RX ORDER — ASPIRIN 81 MG/1
1 TABLET TABLET, CHEWABLE ORAL ONCE A DAY
Status: ACTIVE | COMMUNITY

## 2024-07-01 RX ORDER — ICOSAPENT ETHYL 1000 MG/1
2 CAPSULES WITH MEALS CAPSULE ORAL TWICE A DAY
Status: ACTIVE | COMMUNITY

## 2024-07-01 RX ORDER — LIRAGLUTIDE 6 MG/ML
AS DIRECTED INJECTION SUBCUTANEOUS
Status: ACTIVE | COMMUNITY

## 2024-07-01 RX ORDER — DULOXETINE 60 MG/1
1 CAPSULE CAPSULE, DELAYED RELEASE PELLETS ORAL ONCE A DAY
Status: ACTIVE | COMMUNITY

## 2024-07-01 RX ORDER — LACTOBACILLUS ACIDOPHILUS/PECT 30 MG-20MG
1 TABLET TABLET ORAL ONCE A DAY
Status: ACTIVE | COMMUNITY

## 2024-07-01 RX ORDER — ASCORBIC ACID 125 MG
AS DIRECTED TABLET,CHEWABLE ORAL
Status: ACTIVE | COMMUNITY

## 2024-07-01 RX ORDER — POLYETHYLENE GLYCOL 3350, NF POWDER FOR SOLUTION, LAXATIVE 17 G/D
1 SCOOP MIXED WITH 8 OUNCES OF FLUID POWDER, FOR SOLUTION ORAL
Qty: 1 | Refills: 1 | Status: ACTIVE | COMMUNITY

## 2024-07-01 RX ORDER — CETIRIZINE HYDROCHLORIDE 10 MG/1
1 TABLET TABLET, FILM COATED ORAL ONCE A DAY
Status: ACTIVE | COMMUNITY

## 2024-07-01 RX ORDER — ALBUTEROL SULFATE 90 UG/1
1 PUFF AS NEEDED AEROSOL, METERED RESPIRATORY (INHALATION)
Status: ACTIVE | COMMUNITY

## 2024-07-01 RX ORDER — METFORMIN HYDROCHLORIDE 500 MG/1
1 TABLET WITH A MEAL TABLET, FILM COATED ORAL ONCE A DAY
Status: ACTIVE | COMMUNITY

## 2024-07-01 RX ORDER — ROSUVASTATIN 20 MG/1
1 TABLET TABLET, FILM COATED ORAL ONCE A DAY
Status: ACTIVE | COMMUNITY

## 2024-07-01 NOTE — HPI-TODAY'S VISIT:
The patient presents on referral from Cuong Pastor MD for choking.  She reports issues with dysphagia present for at least the past 2-3 years.  She admits that she eats and swallows very quickly. She underwent a barium swallow which revealed a spastic esophagus. She had barium swallow 7-8 years ago and the Barium pill took a while to go down. She swallows multiple pills and has no issues with these. She has been on Omeprazole 40mg daily with good control of symptoms.  She has continued having episodes of choking despite undergoing empiric esoph dilation (no stricture per se was seen). She has noticed it mainly to dry meats and rice. Sometimes she will also choke on her own saliva. These episodes of choking also occur to her daughter and sister.  Today we reviewed the results of her MBS. Modified barium study performed on 5/2/2024 revealed trace lingual and vallecular residue is cleared with additional swallow independently.  Esophageal screen is positive for retention and retrograde flow.  Barium pill retained in the medial to distal esophagus for more than 60 seconds after swallowing.  Cleared with mildly thick liquid.  Retention and retrograde flow of majority of puree bolus in the proximal esophagus noted.  Majority of puree bolus still remained in esophagus after greater than 60 seconds. Although pharyngeally the patient does not demonstrate any deficits at this time, retention and retrograde flow to proximal esophagus place.  High risk of aspirating retained bolus after the swallow.  It was recommended that patient consume regular and thin liquid diet. It was recommended she follow-up with the speech therapist to provide additional education and training and follow-up with me to address esophageal observations.  The patient states that she takes PEG daily for chronic constipation with excellent results. She tells me that she has a soft, formed BM daily. She has not seen any blood in the stools. She denies any abdominal pain. She is also on a probiotic.  She has a remote history of volvulus.  She is s/p hysterectomy for endometriosis. She also has a history of ovarian cancer.  She has known DM. She is on Ozempic. She does not take any blood thinners.  There is no FH of colon cancer or colon polyps.  She broke her R wrist since her last visit.   She will be moving to [place] and will be spending a few months a year with her daughter in [place].  **She reports issues with anesthesia, where it takes longer than usual for her to wake up from anesthesia. She also recalls waking up very nauseous from anesthesia. She had a colonoscopy in 2022 and had absolutely no issues waking up from the Propofol hence I have no issues scheduling her at [facility] [place].  Summary of prior work up: - Modified barium study performed on 5/2/2024 revealed trace lingual and vallecular residue is cleared with additional swallow independently.  Esophageal screen is positive for retention and retrograde flow.  Barium pill retained in the medial to distal esophagus for more than 60 seconds after swallowing.  Cleared with mildly thick liquid.  Retention and retrograde flow of majority of puree bolus in the proximal esophagus noted.  Majority of puree bolus still remained in esophagus after greater than 60 seconds. Although pharyngeally the patient does not demonstrate any deficits at this time, retention and retrograde flow to proximal esophagus place.  High risk of aspirating retained bolus after the swallow.  It was recommended that patient consume regular and thin liquid diet.  Follow-up with the speech therapist to provide additional education and training and follow-up with GI to address esophageal observations.  - CT Chest without contrast on 3/18/24: The lower neck and thoracic inlet are normal. No lymph node enlargement. The heart size is normal. Normal caliber central pulmonary arteries. No coronary artery calcification. No pericardial effusion or thickening. Normal caliber thoracic aorta with mild atherosclerotic calcification. The central airways are patent. No suspicious pulmonary nodules. No pleural effusion or thickening. - EGD by me on 3/15/2024 revealed downhill esophageal varices in the upper third of the esophagus. 1 tongues of salmon-colored mucosa present from 36-37 cm consistent with NDBE. No endoscopic evidence of stricture status post empiric dilation to 20 mm. No gross lesions in the fundus. Few fundic gland polyps in the body. Patchy mild erythema in the antrum. 2 cm hiatal hernia. Patent pyloric channel. Adequate flattening of the fold upon insufflation. Normal duodenum and ampulla. Biopsies were negative for H. pylori or intestinal metaplasia. - Colonoscopy by me on 5/2/22: Both polyps removed were benign, but with evidence of lymphocyctic colitits. Given the fair quality of the prep however I do recommend a repeat colonoscopy in 3 years if her overall health status allows. - Labs on 2/13/20: HbA1c 6.5%, Gluc 122, BUN 13, Cr 0.69, Na 141, K 4.5, TP 6.1, Alb 4.4, AST 32, ALT 42. TB 0.5. AP 44. TSH 1.15. - Colonoscopy in 2017 showed diverticulosis and internal hemorrhoids. A repeat colonoscopy was advised in five years. - Colonoscopy to the descending colon on 1/15/14 for evaluation of abnormal CAT scan findings consistent with thickening of the left colon showed a stercoral ulcer/volvulus. - Flexible sigmoidoscopy on 1/10/14 for evaluation of left lower quadrant abdominal pain showed diverticulosis but no colitis. Minimal friability was present in the distal rectum likely secondary to recent fleet enema. Left stephany biopsies showed mild edema but no microscopic colitis. - Colonoscopy on 2/14/13 for colon cancer screening revealed sigmoid diverticulosis, an 8 mm polyp in the rectum (biopsies revealed mild active proctitis). The quality of the prep was fair.